# Patient Record
Sex: MALE | Race: WHITE | NOT HISPANIC OR LATINO | Employment: OTHER | RURAL
[De-identification: names, ages, dates, MRNs, and addresses within clinical notes are randomized per-mention and may not be internally consistent; named-entity substitution may affect disease eponyms.]

---

## 2019-10-19 ENCOUNTER — OFFICE VISIT (OUTPATIENT)
Dept: RETAIL CLINIC | Facility: CLINIC | Age: 69
End: 2019-10-19

## 2019-10-19 VITALS
TEMPERATURE: 96.4 F | HEIGHT: 65 IN | HEART RATE: 82 BPM | WEIGHT: 226 LBS | OXYGEN SATURATION: 96 % | DIASTOLIC BLOOD PRESSURE: 82 MMHG | BODY MASS INDEX: 37.65 KG/M2 | SYSTOLIC BLOOD PRESSURE: 140 MMHG | RESPIRATION RATE: 12 BRPM

## 2019-10-19 DIAGNOSIS — R05.9 COUGHING: Primary | ICD-10-CM

## 2019-10-19 PROCEDURE — 99203 OFFICE O/P NEW LOW 30 MIN: CPT | Performed by: NURSE PRACTITIONER

## 2019-10-19 RX ORDER — METFORMIN HYDROCHLORIDE 500 MG/1
1000 TABLET, EXTENDED RELEASE ORAL EVERY EVENING
Refills: 2 | COMMUNITY
Start: 2019-08-04

## 2019-10-19 RX ORDER — BENZONATATE 200 MG/1
200 CAPSULE ORAL 3 TIMES DAILY PRN
Qty: 30 CAPSULE | Refills: 0 | Status: SHIPPED | OUTPATIENT
Start: 2019-10-19 | End: 2019-10-29

## 2019-10-19 RX ORDER — ERYTHROMYCIN 5 MG/G
OINTMENT OPHTHALMIC
Refills: 1 | COMMUNITY
Start: 2019-09-11

## 2019-10-19 RX ORDER — PREDNISOLONE ACETATE 10 MG/ML
SUSPENSION/ DROPS OPHTHALMIC
Refills: 5 | COMMUNITY
Start: 2019-07-24

## 2019-10-19 RX ORDER — OFLOXACIN 3 MG/ML
SOLUTION/ DROPS OPHTHALMIC
Refills: 5 | COMMUNITY
Start: 2019-07-24

## 2019-10-19 RX ORDER — GLIPIZIDE 5 MG/1
5 TABLET, FILM COATED, EXTENDED RELEASE ORAL EVERY MORNING
Refills: 0 | COMMUNITY
Start: 2019-09-23

## 2019-10-19 RX ORDER — SIMVASTATIN 40 MG
40 TABLET ORAL DAILY
Refills: 3 | COMMUNITY
Start: 2019-08-04

## 2019-10-19 RX ORDER — CYCLOPENTOLATE HYDROCHLORIDE 10 MG/ML
SOLUTION/ DROPS OPHTHALMIC
Refills: 0 | COMMUNITY
Start: 2019-09-11

## 2019-10-19 RX ORDER — DEXTROMETHORPHAN HYDROBROMIDE AND PROMETHAZINE HYDROCHLORIDE 15; 6.25 MG/5ML; MG/5ML
5 SYRUP ORAL 4 TIMES DAILY PRN
Qty: 240 ML | Refills: 0 | Status: SHIPPED | OUTPATIENT
Start: 2019-10-19 | End: 2019-10-29

## 2019-10-19 RX ORDER — BROMPHENIRAMINE MALEATE, PSEUDOEPHEDRINE HYDROCHLORIDE, AND DEXTROMETHORPHAN HYDROBROMIDE 2; 30; 10 MG/5ML; MG/5ML; MG/5ML
5 SYRUP ORAL 4 TIMES DAILY PRN
Qty: 240 ML | Refills: 0 | Status: SHIPPED | OUTPATIENT
Start: 2019-10-19 | End: 2019-10-19 | Stop reason: SDUPTHER

## 2019-10-19 NOTE — PROGRESS NOTES
"Subjective   Lester Howard is a 69 y.o. male.     Cough   This is a new problem. The current episode started in the past 7 days. The problem occurs every few minutes. The cough is non-productive. Associated symptoms include nasal congestion. Pertinent negatives include no chest pain, chills, ear congestion, ear pain, fever, headaches, myalgias, postnasal drip, rash, rhinorrhea, sore throat, shortness of breath, sweats, weight loss or wheezing. He has tried OTC cough suppressant for the symptoms. The treatment provided no relief. There is no history of asthma, bronchitis or pneumonia.        The following portions of the patient's history were reviewed and updated as appropriate: allergies, current medications, past medical history, past social history, past surgical history and problem list.    Review of Systems   Constitutional: Negative.  Negative for appetite change, chills, fatigue, fever and weight loss.   HENT: Positive for congestion. Negative for ear pain, postnasal drip, rhinorrhea, sinus pressure, sneezing and sore throat.    Eyes: Negative.    Respiratory: Positive for cough (severe, persistent) and chest tightness. Negative for shortness of breath and wheezing.    Cardiovascular: Negative.  Negative for chest pain.   Gastrointestinal: Negative for diarrhea, nausea and vomiting.   Musculoskeletal: Negative.  Negative for myalgias.   Skin: Negative.  Negative for rash.   Neurological: Negative.  Negative for dizziness and headaches.   Hematological: Negative for adenopathy.        /82   Pulse 82   Temp 96.4 °F (35.8 °C) (Oral)   Resp 12   Ht 165.1 cm (65\")   Wt 103 kg (226 lb)   SpO2 96%   BMI 37.61 kg/m²      Objective   Physical Exam   Constitutional: He is oriented to person, place, and time. Vital signs are normal. He appears well-developed and well-nourished.   HENT:   Head: Normocephalic.   Right Ear: External ear and ear canal normal. No drainage, swelling or tenderness. Tympanic " membrane is not erythematous and not bulging.   Left Ear: External ear and ear canal normal. No drainage, swelling or tenderness. Tympanic membrane is not erythematous and not bulging.   Nose: Mucosal edema and rhinorrhea present. Right sinus exhibits no maxillary sinus tenderness and no frontal sinus tenderness. Left sinus exhibits no maxillary sinus tenderness and no frontal sinus tenderness.   Mouth/Throat: Uvula is midline, oropharynx is clear and moist and mucous membranes are normal. Tonsils are 0 on the right. Tonsils are 0 on the left. No tonsillar exudate.   Eyes: Conjunctivae and lids are normal. Pupils are equal, round, and reactive to light. Lids are everted and swept, no foreign bodies found. Right eye exhibits no discharge. Left eye exhibits no discharge.   Cardiovascular: Normal rate, regular rhythm, S1 normal, S2 normal and normal heart sounds.   Pulmonary/Chest: Effort normal and breath sounds normal. No stridor. No respiratory distress. He has no decreased breath sounds. He has no wheezes. He has no rhonchi. He has no rales.   Severe, persistent cough noted during exam   Abdominal: Soft. Normal appearance and bowel sounds are normal. There is no tenderness. There is no rebound and no guarding.   Lymphadenopathy:        Head (right side): No tonsillar adenopathy present.        Head (left side): No tonsillar adenopathy present.     He has no cervical adenopathy.   Neurological: He is alert and oriented to person, place, and time.   Skin: Skin is warm, dry and intact. No rash noted. He is not diaphoretic.   Psychiatric: He has a normal mood and affect. His speech is normal and behavior is normal. Thought content normal.   Vitals reviewed.      Assessment/Plan   Lester was seen today for cough.    Diagnoses and all orders for this visit:    Coughing  -     benzonatate (TESSALON) 200 MG capsule; Take 1 capsule by mouth 3 (Three) Times a Day As Needed for Cough for up to 10 days.  -      brompheniramine-pseudoephedrine-DM 30-2-10 MG/5ML syrup; Take 5 mL by mouth 4 (Four) Times a Day As Needed for Cough for up to 5 days.

## 2020-06-25 ENCOUNTER — TRANSCRIBE ORDERS (OUTPATIENT)
Dept: CARDIOLOGY | Facility: CLINIC | Age: 70
End: 2020-06-25

## 2020-06-25 DIAGNOSIS — R94.39 ABNORMAL STRESS TEST: Primary | ICD-10-CM

## 2020-07-06 ENCOUNTER — APPOINTMENT (OUTPATIENT)
Dept: PREADMISSION TESTING | Facility: HOSPITAL | Age: 70
End: 2020-07-06

## 2021-02-23 ENCOUNTER — HOSPITAL ENCOUNTER (EMERGENCY)
Age: 71
Discharge: HOME | End: 2021-02-23
Payer: MEDICARE

## 2021-02-23 VITALS — HEART RATE: 115 BPM | DIASTOLIC BLOOD PRESSURE: 79 MMHG | SYSTOLIC BLOOD PRESSURE: 135 MMHG | OXYGEN SATURATION: 93 %

## 2021-02-23 VITALS
OXYGEN SATURATION: 95 % | HEART RATE: 65 BPM | TEMPERATURE: 98.5 F | RESPIRATION RATE: 18 BRPM | DIASTOLIC BLOOD PRESSURE: 70 MMHG | SYSTOLIC BLOOD PRESSURE: 130 MMHG

## 2021-02-23 VITALS
DIASTOLIC BLOOD PRESSURE: 87 MMHG | OXYGEN SATURATION: 96 % | SYSTOLIC BLOOD PRESSURE: 134 MMHG | RESPIRATION RATE: 16 BRPM | TEMPERATURE: 98.2 F | HEART RATE: 84 BPM

## 2021-02-23 VITALS — BODY MASS INDEX: 38.4 KG/M2

## 2021-02-23 DIAGNOSIS — J95.00: Primary | ICD-10-CM

## 2021-02-23 PROCEDURE — 99283 EMERGENCY DEPT VISIT LOW MDM: CPT

## 2021-02-26 ENCOUNTER — HOSPITAL ENCOUNTER (OUTPATIENT)
Dept: HOSPITAL 22 - HMH.JM | Age: 71
End: 2021-02-26
Payer: MEDICARE

## 2021-02-26 DIAGNOSIS — R53.83: ICD-10-CM

## 2021-02-26 DIAGNOSIS — R79.89: Primary | ICD-10-CM

## 2021-02-26 LAB
ALBUMIN LEVEL: 3.4 G/DL (ref 3.5–5)
ALBUMIN/GLOB SERPL: 0.9 {RATIO} (ref 1.1–1.8)
ALP ISO SERPL-ACNC: 188 U/L (ref 38–126)
ALT SERPLBLD-CCNC: 41 U/L (ref 12–78)
ANION GAP SERPL CALC-SCNC: 12.8 MEQ/L (ref 5–15)
AST SERPL QL: 32 U/L (ref 17–59)
BILIRUBIN,TOTAL: 0.6 MG/DL (ref 0.2–1.3)
BUN SERPL-MCNC: 59 MG/DL (ref 9–20)
CALCIUM SPEC-MCNC: 9.4 MG/DL (ref 8.4–10.2)
CHLORIDE SPEC-SCNC: 95 MMOL/L (ref 98–107)
CHOLEST SPEC-SCNC: 119 MG/DL (ref 140–200)
CO2 SERPL-SCNC: 32 MMOL/L (ref 22–30)
CREAT BLD-SCNC: 1.3 MG/DL (ref 0.66–1.25)
ESTIMATED GLOMERULAR FILT RATE: 55 ML/MIN (ref 60–?)
GFR (AFRICAN AMERICAN): 66 ML/MIN (ref 60–?)
GLOBULIN SER CALC-MCNC: 3.9 G/DL (ref 1.3–3.2)
GLUCOSE: 174 MG/DL (ref 74–100)
HBA1C MFR BLD: 6 % (ref 4–6)
HCT VFR BLD CALC: 29.6 % (ref 42–52)
HDLC SERPL-MCNC: 18 MG/DL (ref 40–60)
HGB BLD-MCNC: 8.6 G/DL (ref 14.1–18)
MCHC RBC-ENTMCNC: 29 G/DL (ref 31.8–35.4)
MCV RBC: 85.2 FL (ref 80–94)
MEAN CORPUSCULAR HEMOGLOBIN: 24.7 PG (ref 27–31.2)
PLATELET # BLD: 546 K/MM3 (ref 142–424)
POTASSIUM: 4.8 MMOL/L (ref 3.5–5.1)
PROT SERPL-MCNC: 7.3 G/DL (ref 6.3–8.2)
RBC # BLD AUTO: 3.47 M/MM3 (ref 4.6–6.2)
SODIUM SPEC-SCNC: 135 MMOL/L (ref 136–145)
TRIGLYCERIDES: 336 MG/DL (ref 30–150)
TSH SERPL-ACNC: 6.44 UIU/ML (ref 0.47–4.68)
WBC # BLD AUTO: 12.1 K/MM3 (ref 4.8–10.8)

## 2021-02-26 PROCEDURE — 80061 LIPID PANEL: CPT

## 2021-02-26 PROCEDURE — 85025 COMPLETE CBC W/AUTO DIFF WBC: CPT

## 2021-02-26 PROCEDURE — 80053 COMPREHEN METABOLIC PANEL: CPT

## 2021-02-26 PROCEDURE — 83036 HEMOGLOBIN GLYCOSYLATED A1C: CPT

## 2021-02-26 PROCEDURE — 84443 ASSAY THYROID STIM HORMONE: CPT

## 2021-03-01 ENCOUNTER — HOSPITAL ENCOUNTER (OUTPATIENT)
Age: 71
End: 2021-03-01
Payer: MEDICARE

## 2021-03-01 DIAGNOSIS — R79.89: Primary | ICD-10-CM

## 2021-03-01 LAB
ALBUMIN LEVEL: 3.2 G/DL (ref 3.5–5)
ALBUMIN/GLOB SERPL: 0.8 {RATIO} (ref 1.1–1.8)
ALP ISO SERPL-ACNC: 163 U/L (ref 38–126)
ALT SERPLBLD-CCNC: 22 U/L (ref 12–78)
ANION GAP SERPL CALC-SCNC: 12.2 MEQ/L (ref 5–15)
AST SERPL QL: 20 U/L (ref 17–59)
BILIRUBIN,TOTAL: 0.4 MG/DL (ref 0.2–1.3)
BUN SERPL-MCNC: 48 MG/DL (ref 9–20)
CALCIUM SPEC-MCNC: 8.7 MG/DL (ref 8.4–10.2)
CHLORIDE SPEC-SCNC: 95 MMOL/L (ref 98–107)
CO2 SERPL-SCNC: 31 MMOL/L (ref 22–30)
CREAT BLD-SCNC: 1.2 MG/DL (ref 0.66–1.25)
ESTIMATED GLOMERULAR FILT RATE: 60 ML/MIN (ref 60–?)
GFR (AFRICAN AMERICAN): 72 ML/MIN (ref 60–?)
GLOBULIN SER CALC-MCNC: 3.8 G/DL (ref 1.3–3.2)
GLUCOSE: 139 MG/DL (ref 74–100)
HCT VFR BLD CALC: 26.5 % (ref 42–52)
HGB BLD-MCNC: 8.2 G/DL (ref 14.1–18)
MCHC RBC-ENTMCNC: 30.9 G/DL (ref 31.8–35.4)
MCV RBC: 81.2 FL (ref 80–94)
MEAN CORPUSCULAR HEMOGLOBIN: 25.1 PG (ref 27–31.2)
PLATELET # BLD: 450 K/MM3 (ref 142–424)
POTASSIUM: 4.2 MMOL/L (ref 3.5–5.1)
PROT SERPL-MCNC: 7 G/DL (ref 6.3–8.2)
RBC # BLD AUTO: 3.26 M/MM3 (ref 4.6–6.2)
SODIUM SPEC-SCNC: 134 MMOL/L (ref 136–145)
WBC # BLD AUTO: 10.3 K/MM3 (ref 4.8–10.8)

## 2021-03-01 PROCEDURE — 36415 COLL VENOUS BLD VENIPUNCTURE: CPT

## 2021-03-01 PROCEDURE — 85025 COMPLETE CBC W/AUTO DIFF WBC: CPT

## 2021-03-01 PROCEDURE — 80053 COMPREHEN METABOLIC PANEL: CPT
